# Patient Record
Sex: FEMALE | Race: WHITE | NOT HISPANIC OR LATINO | Employment: FULL TIME | URBAN - METROPOLITAN AREA
[De-identification: names, ages, dates, MRNs, and addresses within clinical notes are randomized per-mention and may not be internally consistent; named-entity substitution may affect disease eponyms.]

---

## 2017-01-04 ENCOUNTER — CONVERSION ENCOUNTER (OUTPATIENT)
Dept: OTHER | Facility: HOSPITAL | Age: 47
End: 2017-01-04

## 2021-05-21 ENCOUNTER — APPOINTMENT (OUTPATIENT)
Dept: WOMENS IMAGING | Facility: HOSPITAL | Age: 51
End: 2021-05-21

## 2021-05-21 PROCEDURE — 77067 SCR MAMMO BI INCL CAD: CPT | Performed by: RADIOLOGY

## 2021-06-11 ENCOUNTER — APPOINTMENT (OUTPATIENT)
Dept: WOMENS IMAGING | Facility: HOSPITAL | Age: 51
End: 2021-06-11

## 2021-06-11 PROCEDURE — 77061 BREAST TOMOSYNTHESIS UNI: CPT | Performed by: RADIOLOGY

## 2021-06-11 PROCEDURE — G0279 TOMOSYNTHESIS, MAMMO: HCPCS | Performed by: RADIOLOGY

## 2021-06-11 PROCEDURE — 77065 DX MAMMO INCL CAD UNI: CPT | Performed by: RADIOLOGY

## 2021-06-24 ENCOUNTER — APPOINTMENT (OUTPATIENT)
Dept: WOMENS IMAGING | Facility: HOSPITAL | Age: 51
End: 2021-06-24

## 2021-06-24 PROCEDURE — 19081 BX BREAST 1ST LESION STRTCTC: CPT | Performed by: RADIOLOGY

## 2021-07-14 ENCOUNTER — OFFICE VISIT (OUTPATIENT)
Dept: SURGERY | Facility: CLINIC | Age: 51
End: 2021-07-14

## 2021-07-14 VITALS — BODY MASS INDEX: 34.95 KG/M2 | HEIGHT: 68 IN | WEIGHT: 230.6 LBS

## 2021-07-14 DIAGNOSIS — D24.2 INTRADUCTAL PAPILLOMA OF BREAST, LEFT: Primary | ICD-10-CM

## 2021-07-14 PROCEDURE — 99203 OFFICE O/P NEW LOW 30 MIN: CPT | Performed by: STUDENT IN AN ORGANIZED HEALTH CARE EDUCATION/TRAINING PROGRAM

## 2021-07-14 RX ORDER — DIPHENOXYLATE HYDROCHLORIDE AND ATROPINE SULFATE 2.5; .025 MG/1; MG/1
TABLET ORAL DAILY
COMMUNITY

## 2021-07-14 RX ORDER — ESTRADIOL 1 MG/1
1 TABLET ORAL DAILY
COMMUNITY

## 2021-07-14 NOTE — PROGRESS NOTES
General Surgery History and Physical Exam     Summary:    50 y.o. lady with a new diagnosis of left breast intraductal papilloma. We discussed her most recent imaging, exam, and pathology findings.  We reviewed the nature of papillomas.  We discussed the option of surgical excision versus imaging follow-up for cases where: There is no atypia at the time of core biopsy, the biopsy is a large gauge core needle with multiple samples supporting adequate tissue sampling, there is no residual on imaging or lesion appears to completely resolved.  We discussed that there can occasionally be a pathologic upgrade when the above criteria are not met.  We discussed that peripheral papillomas have a higher degree of association with atypia than central.  We discussed that papillary lesions without atypia can have a an upgrade rate ranging from 0 to 6%.  She would like to proceed as follows: 6 month follow up diagnostic mammogram and US to ensure resolution. If any residual findings, will plan for wire-localized excisional biopsy.    Breast cancer risk stratification:   Milan Jennings lifetime risk: 9.1%   Clarice model 5 year risk: 1.0%    Referring Provider: No ref. provider found    Chief Complaint: abnormal breast imaging    History of Present Illness: Ms. Julia Jose is a 50 y.o. year old lady, seen at the request of No ref. provider found for a new diagnosis of left breast intraductal papilloma.    This was initially detected as an imaging abnormality. She has had annual mammograms each year mostly, she believes it has been 2 years since her last. She denies any prior history of abnormal mammograms or breast biopsies. Her work-up is detailed in the oncologic history below.     She denies any breast lumps, pain, skin changes, or nipple discharge. She denies any family history of breast or ovarian cancer.     Workup of Current Diagnosis:    6/24/2021 stereotactic left breast biopsy:  The patient's left breast at 230 was  isolated and calcifications were localized.  12 core biopsies were obtained.  The specimen was radiographed confirming calcifications in the sample.  A Top-Hat-shaped clip was deployed.  A 2 view postprocedure mammogram confirmed representative sampling of the finding and removal of the lesion and marker clip placement.  Impression:  Technically successful stereotactic left breast biopsy with marker clip placement and positive radiograph.  A 2 view mammogram shows clip in the expected position.  Pathology returned benign and concordant    2021 pathology:  Left breast tissue at 230, stereotactic biopsy:  Benign fibrofatty breast tissue showing florid intraductal hyperplasia of the usual type, intraductal papilloma with usual ductal hyperplasia, and stromal microcalcifications    Gynecologic History:   G: 3. P: 2. AB: 1.  Age at first childbirth: 23  Lactation/How lon years  Age at menarche: 14  Age at menopause: Not applicable  Total years of oral contraceptive use: Few years  Total years of hormone replacement therapy: None    Past Medical History:   • None    Past Surgical History:    •  x2  • Tonsillectomy  • Ablation in   • [place] tooth extraction  • D&C  • Tubal ligation  • Umbilical hernia repair    Family History:    • No breast or ovarian cancer  • Mother with endometrial cancer    Social History:   • Denies tobacco use  • Occasional alcohol use    Allergies:   Allergies   Allergen Reactions   • Morphine Itching       Medications:     Current Outpatient Medications:   •  B Complex Vitamins (VITAMIN B COMPLEX PO), Take  by mouth Daily., Disp: , Rfl:   •  estradiol (ESTRACE) 1 MG tablet, Take 1 mg by mouth Daily., Disp: , Rfl:   •  multivitamin (Multi Vitamin Daily) tablet tablet, Take  by mouth Daily., Disp: , Rfl:   •  Omega-3 Fatty Acids (OMEGA-3 PO), Take  by mouth., Disp: , Rfl:     Laboratory Values:    No recent labs    Review of Systems:   Influenza-like illness: no fever, no   cough, no  sore throat, no  body aches, no loss of sense of taste or smell, no known exposure to person with Covid-19.  Constitutional: Negative for fevers or chills  HENT: Negative for hearing loss or runny nose  Eyes: Negative for vision changes or scleral icterus  Respiratory: Negative for cough or shortness of breath  Cardiovascular: Negative for chest pain or heart palpitations  Gastrointestinal: Negative for abdominal pain, nausea, vomiting, constipation, melena, or hematochezia  Genitourinary: Negative for hematuria or dysuria  Musculoskeletal: Negative for joint swelling or gait instability  Neurologic: Negative for tremors or seizures  Psychiatric: Negative for suicidal ideations or depression  All other systems reviewed and negative    Physical Exam:   • ECO - Asymptomatic  • Constitutional: Well-developed well-nourished, no acute distress  • Eyes: Conjunctiva normal, sclera nonicteric  • ENMT: Hearing grossly normal, oral mucosa moist  • Neck: Supple, no palpable mass, trachea midline  • Respiratory: Clear to auscultation, normal inspiratory effort  • Cardiovascular: Regular rate, no peripheral edema, no jugular venous distention  • Breast: symmetric  o Right: No visible abnormalities on inspection while seated, with arms raised or hands on hips. No masses, skin changes, or nipple abnormalities.  o Left: No visible abnormalities on inspection while seated, with arms raised or hands on hips. No masses, skin changes, or nipple abnormalities.  o Biopsy site appreciated in left outer breast, otherwise no skin changes.   o No clinical chest wall involvement.  • Gastrointestinal: Soft, nontender  • Lymphatics (palpable nodes): No cervical, supraclavicular or axillary lymphadenopathy  • Skin:  Warm, dry, no rash on visualized skin surfaces  • Musculoskeletal: Symmetric strength, normal gait  • Psychiatric: Alert and oriented ×3, normal affect     ЮЛИЯ SINGLETARY M.D.  General and Endoscopic Surgery  [facility]    [address], [address]  [place], [place], [address]  P: [phone]  F: [phone]

## 2022-01-26 ENCOUNTER — APPOINTMENT (OUTPATIENT)
Dept: WOMENS IMAGING | Facility: HOSPITAL | Age: 52
End: 2022-01-26

## 2022-01-26 PROCEDURE — 76641 ULTRASOUND BREAST COMPLETE: CPT | Performed by: RADIOLOGY

## 2022-01-26 PROCEDURE — G0279 TOMOSYNTHESIS, MAMMO: HCPCS | Performed by: RADIOLOGY

## 2022-01-26 PROCEDURE — 77061 BREAST TOMOSYNTHESIS UNI: CPT | Performed by: RADIOLOGY

## 2022-01-26 PROCEDURE — 77065 DX MAMMO INCL CAD UNI: CPT | Performed by: RADIOLOGY

## 2022-01-27 DIAGNOSIS — D24.2 INTRADUCTAL PAPILLOMA OF BREAST, LEFT: ICD-10-CM

## 2022-02-23 ENCOUNTER — OFFICE VISIT (OUTPATIENT)
Dept: SURGERY | Facility: CLINIC | Age: 52
End: 2022-02-23

## 2022-02-23 VITALS — WEIGHT: 293 LBS | HEIGHT: 68 IN | BODY MASS INDEX: 44.41 KG/M2

## 2022-02-23 DIAGNOSIS — D24.2 INTRADUCTAL PAPILLOMA OF BREAST, LEFT: Primary | ICD-10-CM

## 2022-02-23 PROCEDURE — 99213 OFFICE O/P EST LOW 20 MIN: CPT

## 2022-02-23 NOTE — PROGRESS NOTES
General Surgery History and Physical Exam     Summary: 51 y.o. lady with a diagnosis of left breast intraductal papilloma.   - Surgical Intervention: No surgical intervention at this time.   - Milan Ndiayek lifetime breast cancer risk: 9.1% and Clarice model 5 year risk: 1.0%  - Mammogram and US 1/26/22 reviewed. BIRADS 3, recommended 6 month mammogram and US follow up of left breast. Continue to follow with OBGYN for annual appointments and further imaging. She is released from the breast clinic; follow up in our office as needed.   - Continue with PCP for routine health maintenance and recommend daily activity and healthy lifestyle.     Referring Provider: No ref. provider found    Chief Complaint: abnormal breast imaging    History of Present Illness: Ms. Julia Jose is a 51 y.o. year old lady, seen at the request of No ref. provider found for a new diagnosis of left breast intraductal papilloma.    This was initially detected as an imaging abnormality. She has had annual mammograms each year mostly, she believes it has been 2 years since her last. She denies any prior history of abnormal mammograms or breast biopsies. Her work-up is detailed in the oncologic history below.     She denies any breast lumps, pain, skin changes, or nipple discharge. She denies any family history of breast or ovarian cancer.      2/23/2022 Patient presents today for follow-up of diagnostic mammogram and US. She denies any new complaints. Denies any breast lumps, breast pain, skin changes, or nipple discharge.     Workup of Current Diagnosis:    6/11/2021 Left Breast Digital Diagnostic Mammogram With [place]:   New round calcifications with grouped distribution in the middle one-thrid 2:30 o'clock region of the left breast. These span 3-4 mm.  Additional diffuse round calcifications are noted. There are no new suspicious masses or areas of architectural distortion.  Impression: New calcifications in the left breast are suspicious.  Stereotactic biopsy is recommended.  BI-RADS Category 4A: Suspicious abnormality    6/24/2021 stereotactic left breast biopsy:  The patient's left breast at 230 was isolated and calcifications were localized.  12 core biopsies were obtained.  The specimen was radiographed confirming calcifications in the sample.  A Top-Hat-shaped clip was deployed.  A 2 view postprocedure mammogram confirmed representative sampling of the finding and removal of the lesion and marker clip placement.  Impression: Technically successful stereotactic left breast biopsy with marker clip placement and positive radiograph.  A 2 view mammogram shows clip in the expected position.  Pathology returned benign and concordant    6/25/2021 pathology:  Left breast tissue at 230, stereotactic biopsy:  Benign fibrofatty breast tissue showing florid intraductal hyperplasia of the usual type, intraductal papilloma with usual ductal hyperplasia, and stromal microcalcifications    1/26/2022 Left Breast Digital Diagnostic Mammogram With [place] and Left Breast Complete Breast Ultrasound:  Follow-up examination was performed for the calcifications in the left breast, 2:30 o'clock seen on 6/11/2021. On the present examination, there is a biopsy clip in the middle one-third 2:30 o'clock region of the left breast. Finding is in an area of prior needle biopsy. No suspicious masses or residual microcalcifications are identified.  Ultrasound demonstrates an oval very small hypoechoic area with partially defined margins measuring 5 x 4 x 4 mm in the middle one-third 2:30 o'clock region of the left breast. Note is made of a biopsy clip as evidence of the previous procedure. No internal vascularity identified by Doppler. Likely reflects postbiopsy change.  Impression: Hypoechoic area in the left breast is probably benign. Follow-up mammography in 6 months is recommended. Follow-up ultrasound of the left breast in 6 months is recommended.  BI-RADS Category 3: Probably  benign finding(s)    Gynecologic History:   G: 3. P: 2. AB: 1.  Age at first childbirth: 23  Lactation/How lon years  Age at menarche: 14  Age at menopause: Not applicable  Total years of oral contraceptive use: Few years  Total years of hormone replacement therapy: None    Past Medical History:   • None    Past Surgical History:    •  x2  • Tonsillectomy  • Ablation in   • [place] tooth extraction  • D&C  • Tubal ligation  • Umbilical hernia repair    Family History:    • No breast or ovarian cancer  • Mother with endometrial cancer    Social History:   • Denies tobacco use  • Occasional alcohol use    Allergies:   Allergies   Allergen Reactions   • Morphine Itching       Medications:     Current Outpatient Medications:   •  B Complex Vitamins (VITAMIN B COMPLEX PO), Take  by mouth Daily., Disp: , Rfl:   •  estradiol (ESTRACE) 1 MG tablet, Take 1 mg by mouth Daily., Disp: , Rfl:   •  multivitamin (Multi Vitamin Daily) tablet tablet, Take  by mouth Daily., Disp: , Rfl:   •  Omega-3 Fatty Acids (OMEGA-3 PO), Take  by mouth., Disp: , Rfl:   •  Probiotic Product (FORTIFY DAILY PROBIOTIC PO), Take  by mouth., Disp: , Rfl:     Laboratory Values:    No recent labs    Review of Systems:   Influenza-like illness: no fever, no cough, no sore throat, no body aches, no loss of sense of taste or smell, no known exposure to person with Covid-19.  Constitutional: Negative for fevers or chills  HENT: Negative for hearing loss or runny nose  Eyes: Negative for vision changes or scleral icterus  Respiratory: Negative for cough or shortness of breath  Cardiovascular: Negative for chest pain or heart palpitations  Gastrointestinal: Negative for abdominal pain, nausea, vomiting, constipation, melena, or hematochezia  Genitourinary: Negative for hematuria or dysuria  Musculoskeletal: Negative for joint swelling or gait instability  Neurologic: Negative for tremors or seizures  Psychiatric: Negative for suicidal ideations  or depression  All other systems reviewed and negative    Physical Exam:   • ECO - Asymptomatic  • Constitutional: Well-developed well-nourished, no acute distress  • Eyes: Conjunctiva normal, sclera nonicteric  • ENMT: Hearing grossly normal, oral mucosa moist  • Neck: Supple, no palpable mass, trachea midline  • Respiratory: Clear to auscultation, normal inspiratory effort  • Cardiovascular: Regular rate, no peripheral edema, no jugular venous distention  • Breast: symmetric  o Right: No visible abnormalities on inspection while seated, with arms raised or hands on hips. No masses, skin changes, or nipple abnormalities.  o Left: No visible abnormalities on inspection while seated, with arms raised or hands on hips. No masses, skin changes, or nipple abnormalities.  o Biopsy site healed and appreciated in left breast at 2:30.   o No clinical chest wall involvement.  • Gastrointestinal: Soft, nontender  • Lymphatics (palpable nodes): No cervical, supraclavicular or axillary lymphadenopathy  • Skin: Warm, dry, no rash on visualized skin surfaces  • Musculoskeletal: Symmetric strength, normal gait  • Psychiatric: Alert and oriented ×3, normal affect     Melody Gaona PA-C  General Surgery     [facility]  [address], [address]  [place] [address]  P: [phone]  F: [phone]

## 2022-07-14 ENCOUNTER — TRANSCRIBE ORDERS (OUTPATIENT)
Dept: ADMINISTRATIVE | Facility: HOSPITAL | Age: 52
End: 2022-07-14

## 2022-07-14 DIAGNOSIS — R22.32 ARM MASS, LEFT: Primary | ICD-10-CM

## 2022-07-27 ENCOUNTER — HOSPITAL ENCOUNTER (OUTPATIENT)
Dept: ULTRASOUND IMAGING | Facility: HOSPITAL | Age: 52
Discharge: HOME | End: 2022-07-27
Admitting: NURSE PRACTITIONER

## 2022-07-27 DIAGNOSIS — R22.32 ARM MASS, LEFT: ICD-10-CM

## 2022-07-27 PROCEDURE — 76999 ECHO EXAMINATION PROCEDURE: CPT

## 2022-08-10 ENCOUNTER — APPOINTMENT (OUTPATIENT)
Dept: WOMENS IMAGING | Facility: HOSPITAL | Age: 52
End: 2022-08-10

## 2022-08-10 PROCEDURE — 77066 DX MAMMO INCL CAD BI: CPT | Performed by: RADIOLOGY

## 2022-08-10 PROCEDURE — G0279 TOMOSYNTHESIS, MAMMO: HCPCS | Performed by: RADIOLOGY

## 2022-08-10 PROCEDURE — 76642 ULTRASOUND BREAST LIMITED: CPT | Performed by: RADIOLOGY

## 2022-08-10 PROCEDURE — 77062 BREAST TOMOSYNTHESIS BI: CPT | Performed by: RADIOLOGY

## 2022-08-16 ENCOUNTER — TELEPHONE (OUTPATIENT)
Dept: SURGERY | Facility: CLINIC | Age: 52
End: 2022-08-16

## 2022-08-16 NOTE — TELEPHONE ENCOUNTER
Called and spoke with patient regarding her mammogram/ US results and that they were stable, and to continue to follow up with her OBGYN and her PCP. Will need a breast US in 6 months, patient voiced understanding.

## 2022-08-16 NOTE — TELEPHONE ENCOUNTER
----- Message from Melody Gaona PA-C sent at 8/16/2022  3:02 PM EDT -----  Regarding: Mammogram Results  Hi,     Can you please call her and let her know her mammogram/US results were stable. She does have a cyst in the left breast and recommendation is to have a follow up in 6mo for a repeat breast US. She should continue following with her OBGYN and PCP, no need to follow up with us again unless she has any issues.     Thanks,  Melody

## 2022-08-19 ENCOUNTER — PREP FOR SURGERY (OUTPATIENT)
Dept: OTHER | Facility: HOSPITAL | Age: 52
End: 2022-08-19

## 2022-08-19 ENCOUNTER — TELEPHONE (OUTPATIENT)
Dept: SURGERY | Facility: CLINIC | Age: 52
End: 2022-08-19

## 2022-08-19 DIAGNOSIS — Z12.11 ENCOUNTER FOR SCREENING COLONOSCOPY: Primary | ICD-10-CM

## 2022-08-19 NOTE — TELEPHONE ENCOUNTER
I have never taken care of this lady, so she is unknown to me.  However, she has seen Barb Barrera in the past so I wasn't sure if you meant to send this to Dr. Barrera?  I am happy to do a screening colonoscopy on her and can place the orders.  Please let me know if patient would like to see me or Dr. Barrera.  Thanks,  JACEK

## 2022-10-31 ENCOUNTER — ANESTHESIA (OUTPATIENT)
Dept: GASTROENTEROLOGY | Facility: HOSPITAL | Age: 52
End: 2022-10-31

## 2022-10-31 ENCOUNTER — ANESTHESIA EVENT (OUTPATIENT)
Dept: GASTROENTEROLOGY | Facility: HOSPITAL | Age: 52
End: 2022-10-31

## 2022-10-31 ENCOUNTER — HOSPITAL ENCOUNTER (OUTPATIENT)
Facility: HOSPITAL | Age: 52
Setting detail: HOSPITAL OUTPATIENT SURGERY
Discharge: HOME | End: 2022-10-31
Attending: STUDENT IN AN ORGANIZED HEALTH CARE EDUCATION/TRAINING PROGRAM | Admitting: STUDENT IN AN ORGANIZED HEALTH CARE EDUCATION/TRAINING PROGRAM

## 2022-10-31 VITALS
SYSTOLIC BLOOD PRESSURE: 144 MMHG | HEIGHT: 68 IN | WEIGHT: 293 LBS | BODY MASS INDEX: 44.41 KG/M2 | DIASTOLIC BLOOD PRESSURE: 90 MMHG | RESPIRATION RATE: 16 BRPM | TEMPERATURE: 98.2 F | OXYGEN SATURATION: 97 % | HEART RATE: 72 BPM

## 2022-10-31 LAB
B-HCG UR QL: NEGATIVE
EXPIRATION DATE: NORMAL
INTERNAL NEGATIVE CONTROL: NEGATIVE
INTERNAL POSITIVE CONTROL: POSITIVE
Lab: NORMAL

## 2022-10-31 PROCEDURE — 25010000002 PROPOFOL 10 MG/ML EMULSION: Performed by: ANESTHESIOLOGY

## 2022-10-31 PROCEDURE — 81025 URINE PREGNANCY TEST: CPT | Performed by: STUDENT IN AN ORGANIZED HEALTH CARE EDUCATION/TRAINING PROGRAM

## 2022-10-31 PROCEDURE — 45378 DIAGNOSTIC COLONOSCOPY: CPT | Performed by: STUDENT IN AN ORGANIZED HEALTH CARE EDUCATION/TRAINING PROGRAM

## 2022-10-31 RX ORDER — SODIUM CHLORIDE 0.9 % (FLUSH) 0.9 %
10 SYRINGE (ML) INJECTION EVERY 12 HOURS SCHEDULED
Status: DISCONTINUED | OUTPATIENT
Start: 2022-10-31 | End: 2022-10-31 | Stop reason: HOSPADM

## 2022-10-31 RX ORDER — PROPOFOL 10 MG/ML
VIAL (ML) INTRAVENOUS AS NEEDED
Status: DISCONTINUED | OUTPATIENT
Start: 2022-10-31 | End: 2022-10-31 | Stop reason: SURG

## 2022-10-31 RX ORDER — LIDOCAINE HYDROCHLORIDE 20 MG/ML
INJECTION, SOLUTION INFILTRATION; PERINEURAL AS NEEDED
Status: DISCONTINUED | OUTPATIENT
Start: 2022-10-31 | End: 2022-10-31 | Stop reason: SURG

## 2022-10-31 RX ORDER — SODIUM CHLORIDE, SODIUM LACTATE, POTASSIUM CHLORIDE, CALCIUM CHLORIDE 600; 310; 30; 20 MG/100ML; MG/100ML; MG/100ML; MG/100ML
30 INJECTION, SOLUTION INTRAVENOUS CONTINUOUS PRN
Status: DISCONTINUED | OUTPATIENT
Start: 2022-10-31 | End: 2022-10-31 | Stop reason: HOSPADM

## 2022-10-31 RX ORDER — SODIUM CHLORIDE 0.9 % (FLUSH) 0.9 %
10 SYRINGE (ML) INJECTION AS NEEDED
Status: DISCONTINUED | OUTPATIENT
Start: 2022-10-31 | End: 2022-10-31 | Stop reason: HOSPADM

## 2022-10-31 RX ORDER — OMEPRAZOLE 20 MG/1
20 CAPSULE, DELAYED RELEASE ORAL DAILY
COMMUNITY

## 2022-10-31 RX ADMIN — LIDOCAINE HYDROCHLORIDE 100 MG: 20 INJECTION, SOLUTION INFILTRATION; PERINEURAL at 12:23

## 2022-10-31 RX ADMIN — PROPOFOL 130 MG: 10 INJECTION, EMULSION INTRAVENOUS at 12:23

## 2022-10-31 RX ADMIN — SODIUM CHLORIDE, POTASSIUM CHLORIDE, SODIUM LACTATE AND CALCIUM CHLORIDE 30 ML/HR: 600; 310; 30; 20 INJECTION, SOLUTION INTRAVENOUS at 12:10

## 2022-10-31 RX ADMIN — PROPOFOL 160 MCG/KG/MIN: 10 INJECTION, EMULSION INTRAVENOUS at 12:24

## 2022-10-31 NOTE — ANESTHESIA POSTPROCEDURE EVALUATION
Patient: Julia Jose    Procedure Summary     Date: 10/31/22 Room / Location:  [facility]    Anesthesia Start: 1220 Anesthesia Stop: 1244    Procedure: COLONOSCOPY into cecum Diagnosis:       Encounter for screening colonoscopy      (Encounter for screening colonoscopy [Z12.11])    Surgeons: Barb Barrera MD Provider: Monserrat Martins MD    Anesthesia Type: MAC ASA Status: 3          Anesthesia Type: MAC    Vitals  Vitals Value Taken Time   /91 10/31/22 1254   Temp 36.8 °C (98.2 °F) 10/31/22 1254   Pulse 70 10/31/22 1254   Resp 22 10/31/22 1254   SpO2 98 % 10/31/22 1254           Post Anesthesia Care and Evaluation    Patient location during evaluation: bedside  Patient participation: complete - patient participated  Level of consciousness: awake and alert  Pain management: adequate    Airway patency: patent  Anesthetic complications: No anesthetic complications  PONV Status: controlled  Cardiovascular status: acceptable  Respiratory status: acceptable  Hydration status: acceptable

## 2022-10-31 NOTE — ANESTHESIA PREPROCEDURE EVALUATION
" Anesthesia Evaluation     Patient summary reviewed and Nursing notes reviewed   NPO Solid Status: > 8 hours  NPO Liquid Status: > 2 hours           Airway   Mallampati: III  TM distance: >3 FB  Neck ROM: full  No difficulty expected  Dental      Pulmonary    (+) a smoker Former,   Cardiovascular         Neuro/Psych  (+) seizures,    GI/Hepatic/Renal/Endo    (+)  GERD,      Musculoskeletal     Abdominal    Substance History      OB/GYN          Other                        Anesthesia Plan    ASA 3     MAC     (I have reviewed the patient's history and chart with the patient, including all pertinent laboratory results and imaging. I have explained the risks of anesthesia including but not limited to dental damage, corneal abrasion, nerve injury, MI, stroke, aspiration, and death. Patient has agreed to proceed.     /61 (BP Location: Left arm, Patient Position: Lying)   Pulse 80   Resp 15   Ht 172.7 cm (68\")   Wt (!) 142 kg (313 lb 14.4 oz)   SpO2 95%   BMI 47.73 kg/m²   )  intravenous induction     Anesthetic plan, risks, benefits, and alternatives have been provided, discussed and informed consent has been obtained with: patient.        CODE STATUS:       "

## 2023-02-25 ENCOUNTER — HOSPITAL ENCOUNTER (EMERGENCY)
Facility: HOSPITAL | Age: 53
Discharge: HOME | End: 2023-02-25
Attending: EMERGENCY MEDICINE | Admitting: EMERGENCY MEDICINE
Payer: COMMERCIAL

## 2023-02-25 ENCOUNTER — APPOINTMENT (OUTPATIENT)
Dept: GENERAL RADIOLOGY | Facility: HOSPITAL | Age: 53
End: 2023-02-25
Payer: COMMERCIAL

## 2023-02-25 VITALS
TEMPERATURE: 98.1 F | HEIGHT: 68 IN | HEART RATE: 74 BPM | OXYGEN SATURATION: 98 % | WEIGHT: 290 LBS | BODY MASS INDEX: 43.95 KG/M2 | RESPIRATION RATE: 18 BRPM | SYSTOLIC BLOOD PRESSURE: 144 MMHG | DIASTOLIC BLOOD PRESSURE: 85 MMHG
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
## 2023-02-25 DIAGNOSIS — S52.124A CLOSED NONDISPLACED FRACTURE OF HEAD OF RIGHT RADIUS, INITIAL ENCOUNTER: ICD-10-CM

## 2023-02-25 DIAGNOSIS — S80.02XA CONTUSION OF LEFT KNEE, INITIAL ENCOUNTER: ICD-10-CM
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
## 2023-02-25 PROCEDURE — 73090 X-RAY EXAM OF FOREARM: CPT

## 2023-02-25 PROCEDURE — 99283 EMERGENCY DEPT VISIT LOW MDM: CPT

## 2023-02-25 RX ORDER — HYDROCODONE BITARTRATE AND ACETAMINOPHEN 5; 325 MG/1; MG/1
1 TABLET ORAL ONCE
Status: DISCONTINUED | OUTPATIENT
Start: 2023-02-25 | End: 2023-02-25 | Stop reason: HOSPADM

## 2023-02-25 RX ORDER — IBUPROFEN 800 MG/1
800 TABLET ORAL ONCE
Status: COMPLETED | OUTPATIENT
Start: 2023-02-25 | End: 2023-02-25

## 2023-02-25 RX ORDER — HYDROCODONE BITARTRATE AND ACETAMINOPHEN 5; 325 MG/1; MG/1
TABLET ORAL
Qty: 15 TABLET | Refills: 0 | Status: SHIPPED | OUTPATIENT
Start: 2023-02-25

## 2023-02-25 RX ADMIN — IBUPROFEN 800 MG: 800 TABLET, FILM COATED ORAL at 15:32

## 2023-03-29 ENCOUNTER — APPOINTMENT (OUTPATIENT)
Dept: WOMENS IMAGING | Facility: HOSPITAL | Age: 53
End: 2023-03-29
Payer: COMMERCIAL

## 2023-03-29 PROCEDURE — 76642 ULTRASOUND BREAST LIMITED: CPT | Performed by: RADIOLOGY

## 2023-12-18 ENCOUNTER — APPOINTMENT (OUTPATIENT)
Dept: WOMENS IMAGING | Facility: HOSPITAL | Age: 53
End: 2023-12-18
Payer: COMMERCIAL

## 2023-12-18 PROCEDURE — 76642 ULTRASOUND BREAST LIMITED: CPT | Performed by: RADIOLOGY

## 2023-12-18 PROCEDURE — 77066 DX MAMMO INCL CAD BI: CPT | Performed by: RADIOLOGY

## 2023-12-18 PROCEDURE — 77062 BREAST TOMOSYNTHESIS BI: CPT | Performed by: RADIOLOGY

## 2023-12-18 PROCEDURE — G0279 TOMOSYNTHESIS, MAMMO: HCPCS | Performed by: RADIOLOGY

## 2024-08-09 ENCOUNTER — APPOINTMENT (OUTPATIENT)
Dept: WOMENS IMAGING | Facility: HOSPITAL | Age: 54
End: 2024-08-09
Payer: COMMERCIAL

## 2024-08-09 PROCEDURE — G0279 TOMOSYNTHESIS, MAMMO: HCPCS | Performed by: RADIOLOGY

## 2024-08-09 PROCEDURE — 77061 BREAST TOMOSYNTHESIS UNI: CPT | Performed by: RADIOLOGY

## 2024-08-09 PROCEDURE — 77065 DX MAMMO INCL CAD UNI: CPT | Performed by: RADIOLOGY

## 2024-08-09 PROCEDURE — 76642 ULTRASOUND BREAST LIMITED: CPT | Performed by: RADIOLOGY

## 2025-07-01 ENCOUNTER — TRANSCRIBE ORDERS (OUTPATIENT)
Age: 55
End: 2025-07-01
Payer: COMMERCIAL

## 2025-07-01 DIAGNOSIS — I83.92 VARICOSE VEINS OF LEFT LOWER EXTREMITY, UNSPECIFIED WHETHER COMPLICATED: Primary | ICD-10-CM

## 2025-07-15 ENCOUNTER — OFFICE VISIT (OUTPATIENT)
Age: 55
End: 2025-07-15
Payer: COMMERCIAL

## 2025-07-15 VITALS
HEIGHT: 68 IN | DIASTOLIC BLOOD PRESSURE: 80 MMHG | SYSTOLIC BLOOD PRESSURE: 122 MMHG | BODY MASS INDEX: 43.95 KG/M2 | WEIGHT: 290 LBS

## 2025-07-15 DIAGNOSIS — I83.892 SYMPTOMATIC VARICOSE VEINS, LEFT: ICD-10-CM

## 2025-07-15 DIAGNOSIS — Z78.9 NO HISTORY OF DEEP VENOUS THROMBOSIS OR PULMONARY EMBOLUS: ICD-10-CM

## 2025-07-15 DIAGNOSIS — I78.1 SPIDER VEINS: ICD-10-CM

## 2025-07-15 DIAGNOSIS — M79.89 LEG SWELLING: ICD-10-CM

## 2025-07-15 DIAGNOSIS — I83.812 VARICOSE VEINS OF LOWER EXTREMITY WITH PAIN, LEFT: Primary | ICD-10-CM

## 2025-07-15 RX ORDER — HYDROCHLOROTHIAZIDE 12.5 MG/1
12.5 TABLET ORAL DAILY
COMMUNITY

## 2025-07-15 RX ORDER — VENLAFAXINE HYDROCHLORIDE 37.5 MG/1
CAPSULE, EXTENDED RELEASE ORAL
COMMUNITY

## 2025-07-15 NOTE — PROGRESS NOTES
"Chief Complaint  Varicose Veins (Left leg ropey)    Subjective      History of Present Illness  Julia Jose presents to [facility] as a new patient with complaints of lower extremity varicose vein.  Reports aching, heaviness, fatigue, night cramps, swelling in the left leg.  Symptoms began approximately 6 months ago and have gradually worsened.   Symptoms are worse with prolonged standing and at the end of the day.  Aggravating factors includes work. The quality of pain is mild and causes her slight discomfort.    Reports no family history of varicose veins.  Occupation requires a mixture of standing and satting.  Reports that 7 years ago she had a concrete brick fall on her left foot but no other injuries or surgery noted.  Denies prior vein treatments.  Denies history of DVTs and STPs.  No family history of any DVTs.  Attempts at symptomatic control using elevation, compression and nonsteroidals have not been attempted.       Allergies: Morphine     Julia Jose  reports that she has quit smoking. She has been exposed to tobacco smoke. She has never used smokeless tobacco..       Objective   Vital Signs:  /80 (BP Location: Right arm)   Ht 172.7 cm (67.99\")   Wt 132 kg (290 lb)   BMI 44.10 kg/m²   Estimated body mass index is 44.1 kg/m² as calculated from the following:    Height as of this encounter: 172.7 cm (67.99\").    Weight as of this encounter: 132 kg (290 lb).     Class 3 Severe Obesity (BMI >=40). Obesity-related health conditions include the following: lower extremity venous stasis disease. Obesity is newly identified. BMI is is above average; BMI management plan is completed. We discussed portion control and increasing exercise.      Physical Exam  Constitutional:       Appearance: Normal appearance.   Cardiovascular:      Rate and Rhythm: Normal rate and regular rhythm.      Pulses:           Dorsalis pedis pulses are 2+ on the right side and 2+ " on the left side.        Posterior tibial pulses are 2+ on the right side and 2+ on the left side.      Comments: Right Leg: No visible varicose veins.     Left Leg: visible varicose veins at below-knee.    Calf: Medial, Medial-posterior.    Telangiectasias and reticular veins- scattered throughout bilateral lower extremities.  Pulmonary:      Effort: Pulmonary effort is normal.      Breath sounds: Normal breath sounds.   Musculoskeletal:         General: Normal range of motion.      Right lower leg: No edema.      Left lower leg: Edema present.   Skin:     Capillary Refill: Capillary refill takes less than 2 seconds.   Neurological:      Mental Status: She is alert.        Venous Right leg:Telangiectasia  Reticular Vein(s)  Edema  CEAP Classification right leg: C1    Venous Left leg:Edema  Hyperpigmentation  Varicose Veins  CEAP Classification left leg: C4a    Result Review :                         Assessment and Plan     Diagnoses and all orders for this visit:    1. Varicose veins of lower extremity with pain, left (Primary)  -     Venous w Reflux Lower Extremity - Unilateral CAR; Future    2. Symptomatic varicose veins, left  -     Venous w Reflux Lower Extremity - Unilateral CAR; Future    3. Leg swelling  -     Venous w Reflux Lower Extremity - Unilateral CAR; Future    4. Spider veins    5. No history of deep venous thrombosis or pulmonary embolus              Julia Jose presents with complaints of lower extremity varicose vein.  Reports aching, heaviness, fatigue, night cramps, swelling in the left leg.  Symptoms began approximately 6 months ago and have gradually worsened.   Symptoms are worse with prolonged standing and at the end of the day.  Denies prior vein treatments.  Denies history of DVTs and STPs. Her varicose veins are symptomatic and causes her discomfort which impacts her quality of life.  We have discussed the natural history and pathophysiology of venous insufficiency. She was  measured for graded compression stockings.  I have ordered vein mapping and reflux study to evaluate for superficial and deep system reflux.  The physician will then discuss the results and the treatment options.  She was prescribed the following conservative measures: compression hoses 20 to 30 mmHg, leg elevation, exercise, and over-the-counter NSAIDs as needed for pain.     Follow Up     Return for Left lower extremity class 1 mapping and follow-up with Dr. Terrazas.  Patient was given instructions and counseling regarding her condition or for health maintenance advice. Please see specific information pulled into the AVS if appropriate.

## (undated) DEVICE — KT ORCA ORCAPOD DISP STRL

## (undated) DEVICE — TUBING, SUCTION, 1/4" X 10', STRAIGHT: Brand: MEDLINE

## (undated) DEVICE — CANN O2 ETCO2 FITS ALL CONN CO2 SMPL A/ 7IN DISP LF

## (undated) DEVICE — LN SMPL CO2 SHTRM SD STREAM W/M LUER

## (undated) DEVICE — ADAPT CLN BIOGUARD AIR/H2O DISP

## (undated) DEVICE — SENSR O2 OXIMAX FNGR A/ 18IN NONSTR